# Patient Record
Sex: FEMALE | Race: BLACK OR AFRICAN AMERICAN
[De-identification: names, ages, dates, MRNs, and addresses within clinical notes are randomized per-mention and may not be internally consistent; named-entity substitution may affect disease eponyms.]

---

## 2018-05-08 ENCOUNTER — RESULT REVIEW (OUTPATIENT)
Age: 48
End: 2018-05-08

## 2021-03-04 ENCOUNTER — RESULT REVIEW (OUTPATIENT)
Age: 51
End: 2021-03-04

## 2021-07-16 ENCOUNTER — TRANSCRIPTION ENCOUNTER (OUTPATIENT)
Age: 51
End: 2021-07-16

## 2021-12-29 ENCOUNTER — APPOINTMENT (OUTPATIENT)
Dept: ORTHOPEDIC SURGERY | Facility: CLINIC | Age: 51
End: 2021-12-29

## 2022-01-18 DIAGNOSIS — Z00.00 ENCOUNTER FOR GENERAL ADULT MEDICAL EXAMINATION W/OUT ABNORMAL FINDINGS: ICD-10-CM

## 2022-01-19 ENCOUNTER — APPOINTMENT (OUTPATIENT)
Dept: ORTHOPEDIC SURGERY | Facility: CLINIC | Age: 52
End: 2022-01-19

## 2022-02-10 ENCOUNTER — APPOINTMENT (OUTPATIENT)
Dept: ORTHOPEDIC SURGERY | Facility: CLINIC | Age: 52
End: 2022-02-10
Payer: MEDICAID

## 2022-02-10 VITALS — WEIGHT: 220 LBS | HEIGHT: 68 IN | BODY MASS INDEX: 33.34 KG/M2

## 2022-02-10 DIAGNOSIS — S80.02XA CONTUSION OF LEFT KNEE, INITIAL ENCOUNTER: ICD-10-CM

## 2022-02-10 PROCEDURE — 73564 X-RAY EXAM KNEE 4 OR MORE: CPT | Mod: LT

## 2022-02-10 PROCEDURE — 99204 OFFICE O/P NEW MOD 45 MIN: CPT

## 2022-02-10 RX ORDER — MELOXICAM 15 MG/1
15 TABLET ORAL
Qty: 30 | Refills: 1 | Status: ACTIVE | COMMUNITY
Start: 2022-02-10 | End: 1900-01-01

## 2022-02-11 NOTE — DISCUSSION/SUMMARY
[de-identified] : The patient has left knee patella femoral osteoarthritis. They are not an appropriate candidate for surgical intervention at this time. An extensive discussion was conducted on the natural history of the disease and the variety of surgical and non-surgical options available to the patient including, but not limited to non-steroidal anti-inflammatory medications, steroid injections, physical therapy, maintenance of ideal body weight, and reduction of activity. I prescribed Mobic and PT. The patient will schedule an appointment as needed.

## 2022-02-11 NOTE — PHYSICAL EXAM
[de-identified] : Well developed, well nourished in no apparent distress, awake, alert and orientated to person, place and time. with appropriate mood and affect. \par Respirations are even and unlabored. Gait evaluation does reveal a limp. There is no inguinal adenopathy. \par The affected limb is well-perfused, without skin lesions, shows a grossly normal motor and sensory examination. \par Knee motion does causes anterior knee pain. ROM of the knee is 0-120 degrees.  5 degrees varus\par The knee is stable within that range-of-motion to AP and ML stress. \par Muscle strength is normal. Pedal pulses are palpable. \par (+) crepitus [de-identified] : Long standing  knee, AP knee, lateral knee, and patellar views of the left knee were ordered and taken in the office and demonstrate mild-moderate degenerative joint disease of the patella.

## 2022-02-11 NOTE — REVIEW OF SYSTEMS
[Joint Pain] : joint pain [Joint Swelling] : joint swelling [Negative] : Heme/Lymph [Joint Stiffness] : no joint stiffness

## 2022-02-11 NOTE — HISTORY OF PRESENT ILLNESS
[de-identified] : This is a very nice  51 year old  female experiencing  pain in the left knee, over the patella. She had a fall in July and sustained a patella contusion. She has not tried NSAIDs, just Tylenol. Her pain is mild in intensity and has been going on since July 2021. The pain somewhat limits activities of daily living. Walking tolerance is slightly reduced. The patient denies any radiation of the pain to the feet and it is not associated with numbness, tingling, or weakness.

## 2022-03-29 ENCOUNTER — APPOINTMENT (OUTPATIENT)
Dept: ORTHOPEDIC SURGERY | Facility: CLINIC | Age: 52
End: 2022-03-29
Payer: MEDICAID

## 2022-03-29 DIAGNOSIS — S83.412A SPRAIN OF MEDIAL COLLATERAL LIGAMENT OF LEFT KNEE, INITIAL ENCOUNTER: ICD-10-CM

## 2022-03-29 PROCEDURE — 99214 OFFICE O/P EST MOD 30 MIN: CPT

## 2022-03-29 NOTE — HISTORY OF PRESENT ILLNESS
[de-identified] : This is a very nice  51 year old  female experiencing  pain in the left knee, over the patella. She had a fall in July and sustained a patella contusion.  Mobic does help.  Physical therapy has been helping.  Knee is not giving way.  No catching clicking locking. The patient denies any radiation of the pain to the feet and it is not associated with numbness, tingling, or weakness.

## 2022-03-29 NOTE — PHYSICAL EXAM
[de-identified] : Patient is well nourished, well-developed, in no acute distress, with appropriate mood and affect. The patient is oriented to time, place, and person. Respirations are even and unlabored. Gait evaluation does not reveal a limp. There is no inguinal adenopathy. Examination of the contralateral knee shows normal range of motion, strength, no tenderness, and intact skin. The affected limb is well-perfused and showed 2+ dp/pt pulses, without skin lesions, shows a grossly normal motor and sensory examination. Knee motion is painless and the knee moves from 0 to 135 degrees. The knee is stable within that range-of-motion to AP and ML stress with a 1A Lachman, negative anterior or posterior drawer and no instability to varus or valgus stress. The alignment of the knee is 5 degrees varus. No effusion or crepitus is noted.  Tender to palpation the medial and lateral joint line.  No tenderness to palpation about the medial or lateral tibial plateau, medial or lateral femoral condyle, medial or lateral patellar facets, superior or inferior pole of the patella. Mono's is negative. Muscle strength is normal. Pedal pulses are palpable. Hip examination was negative. [de-identified] : The patient brings with her an MRI of the left knee.  I reviewed the MRI imaging with the patient was demonstrates a partial MCL tear.

## 2022-03-29 NOTE — DISCUSSION/SUMMARY
[de-identified] : The patient has left knee pain with a partial-thickness MCL tear consistent with a grade 1 sprain.  They are not an appropriate candidate for surgical intervention at this time. An extensive discussion was conducted on the natural history of the disease and the variety of surgical and non-surgical options available to the patient including, but not limited to non-steroidal anti-inflammatory medications, steroid injections, physical therapy, maintenance of ideal body weight, and reduction of activity.  Continue with Mobic and physical therapy.  If the pain does not improve over the next 6 weeks then she should follow-up with one of my nonoperative sports medicine colleagues and I gave her some names of who to follow-up with.  The patient will schedule an appointment as needed.

## 2022-06-16 ENCOUNTER — APPOINTMENT (OUTPATIENT)
Dept: ORTHOPEDIC SURGERY | Facility: CLINIC | Age: 52
End: 2022-06-16
Payer: MEDICAID

## 2022-06-16 VITALS — BODY MASS INDEX: 33.34 KG/M2 | WEIGHT: 220 LBS | HEIGHT: 68 IN

## 2022-06-16 PROCEDURE — 20610 DRAIN/INJ JOINT/BURSA W/O US: CPT | Mod: LT

## 2022-06-16 PROCEDURE — 99214 OFFICE O/P EST MOD 30 MIN: CPT | Mod: 25

## 2022-06-16 NOTE — HISTORY OF PRESENT ILLNESS
[de-identified] : This is a very nice  51 year old  female experiencing  pain in the left knee, over the patella. She had a recent fall and sustained a patella contusion.  Mobic does help.  Physical therapy has been helping.  Knee is not giving way.  No catching clicking locking. The patient denies any radiation of the pain to the feet and it is not associated with numbness, tingling, or weakness.

## 2022-06-16 NOTE — DISCUSSION/SUMMARY
[de-identified] : The patient has left knee pain secondary to patella-femoral syndrome and patella contusion.  They are not an appropriate candidate for surgical intervention at this time. An extensive discussion was conducted on the natural history of the disease and the variety of surgical and non-surgical options available to the patient including, but not limited to non-steroidal anti-inflammatory medications, steroid injections, physical therapy, maintenance of ideal body weight, and reduction of activity.  Continue with Mobic and physical therapy. \par \par Informed consent for the left knee injection was obtained. All questions were answered. A time out was performed. The  knee was prepped and draped in sterile fashion. Using sterile technique, 80mg of Kenalog, 4cc of 1% lidocaine, 4cc of 0.25% marcaine using a 21-gauge needle. A sterile dressing was applied. Post injection instructions were reviewed. The patient tolerated the procedure well.

## 2022-06-16 NOTE — PHYSICAL EXAM
[de-identified] : Patient is well nourished, well-developed, in no acute distress, with appropriate mood and affect. The patient is oriented to time, place, and person. Respirations are even and unlabored. Gait evaluation does not reveal a limp. There is no inguinal adenopathy. Examination of the contralateral knee shows normal range of motion, strength, no tenderness, and intact skin. The affected limb is well-perfused and showed 2+ dp/pt pulses, without skin lesions, shows a grossly normal motor and sensory examination. Knee motion is painless and the knee moves from 0 to 135 degrees. The knee is stable within that range-of-motion to AP and ML stress with a 1A Lachman, negative anterior or posterior drawer and no instability to varus or valgus stress. The alignment of the knee is 5 degrees varus. No effusion or crepitus is noted.  Tender to palpation the medial and lateral joint line.  No tenderness to palpation about the medial or lateral tibial plateau, medial or lateral femoral condyle, medial or lateral patellar facets, superior or inferior pole of the patella. Mono's is negative. Muscle strength is normal. Pedal pulses are palpable. Hip examination was negative.

## 2022-08-11 ENCOUNTER — APPOINTMENT (OUTPATIENT)
Dept: ORTHOPEDIC SURGERY | Facility: CLINIC | Age: 52
End: 2022-08-11

## 2022-08-11 VITALS — BODY MASS INDEX: 33.34 KG/M2 | HEIGHT: 68 IN | WEIGHT: 220 LBS

## 2022-08-11 DIAGNOSIS — M22.2X2 PATELLOFEMORAL DISORDERS, LEFT KNEE: ICD-10-CM

## 2022-08-11 PROCEDURE — 99214 OFFICE O/P EST MOD 30 MIN: CPT

## 2022-08-11 RX ORDER — HYALURONATE SODIUM 30 MG/2 ML
30 SYRINGE (ML) INTRAARTICULAR
Qty: 1 | Refills: 0 | Status: ACTIVE | COMMUNITY
Start: 2022-08-11

## 2022-08-11 NOTE — PHYSICAL EXAM
[de-identified] : Patient is well nourished, well-developed, in no acute distress, with appropriate mood and affect. The patient is oriented to time, place, and person. Respirations are even and unlabored. Gait evaluation does not reveal a limp. There is no inguinal adenopathy. Examination of the contralateral knee shows normal range of motion, strength, no tenderness, and intact skin. The affected limb is well-perfused and showed 2+ dp/pt pulses, without skin lesions, shows a grossly normal motor and sensory examination. Knee motion is painless and the knee moves from 0 to 135 degrees. The knee is stable within that range-of-motion to AP and ML stress with a 1A Lachman, negative anterior or posterior drawer and no instability to varus or valgus stress. The alignment of the knee is 5 degrees varus.  Mildly positive patellar grind.  No effusion or crepitus is noted. No tenderness to palpation about the medial or lateral joint line, medial or lateral tibial plateau, medial or lateral femoral condyle, medial or lateral patellar facets, superior or inferior pole of the patella. Mono's is negative. Muscle strength is normal. Pedal pulses are palpable. Hip examination was negative. [de-identified] : Long standing knee, AP knee, lateral knee, and patellar views of the [SIDE] knee were ordered and taken in the office and demonstrate no evidence of degenerative joint disease of the knee, fractures, intra-articular pathology.

## 2022-08-11 NOTE — DISCUSSION/SUMMARY
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  They are not an appropriate candidate for surgical intervention at this time. An extensive discussion was conducted on the natural history of the disease and the variety of surgical and non-surgical options available to the patient including, but not limited to non-steroidal anti-inflammatory medications, steroid injections, physical therapy, maintenance of ideal body weight, and reduction of activity.  Continue with Mobic and physical therapy.  Physical therapy was represcribed.  I also am prescribing Orthovisc injections to be performed to the knee and this was sent to the insurance for authorization.  Follow-up when this is authorized

## 2022-08-11 NOTE — HISTORY OF PRESENT ILLNESS
[de-identified] : This is a very nice  51 year old  female experiencing  pain in the left knee, over the patella. History of patellofemoral syndrome. Cortisone helped somewhat. Mobic does help.  Physical therapy has been helping.  Knee is not giving way.  No catching clicking locking. The patient denies any radiation of the pain to the feet and it is not associated with numbness, tingling, or weakness.

## 2022-08-15 ENCOUNTER — FORM ENCOUNTER (OUTPATIENT)
Age: 52
End: 2022-08-15